# Patient Record
Sex: MALE | Race: OTHER | HISPANIC OR LATINO | URBAN - METROPOLITAN AREA
[De-identification: names, ages, dates, MRNs, and addresses within clinical notes are randomized per-mention and may not be internally consistent; named-entity substitution may affect disease eponyms.]

---

## 2018-11-07 ENCOUNTER — EMERGENCY (EMERGENCY)
Facility: HOSPITAL | Age: 58
LOS: 1 days | Discharge: HOME | End: 2018-11-07
Admitting: EMERGENCY MEDICINE
Payer: MEDICAID

## 2018-11-07 VITALS
SYSTOLIC BLOOD PRESSURE: 156 MMHG | DIASTOLIC BLOOD PRESSURE: 92 MMHG | OXYGEN SATURATION: 98 % | TEMPERATURE: 97 F | RESPIRATION RATE: 18 BRPM | HEART RATE: 73 BPM

## 2018-11-07 VITALS
TEMPERATURE: 98 F | DIASTOLIC BLOOD PRESSURE: 89 MMHG | OXYGEN SATURATION: 96 % | RESPIRATION RATE: 20 BRPM | HEART RATE: 71 BPM | SYSTOLIC BLOOD PRESSURE: 165 MMHG

## 2018-11-07 DIAGNOSIS — M54.9 DORSALGIA, UNSPECIFIED: ICD-10-CM

## 2018-11-07 DIAGNOSIS — F11.120 OPIOID ABUSE WITH INTOXICATION, UNCOMPLICATED: ICD-10-CM

## 2018-11-07 PROCEDURE — 99284 EMERGENCY DEPT VISIT MOD MDM: CPT | Mod: 25

## 2018-11-07 PROCEDURE — 70450 CT HEAD/BRAIN W/O DYE: CPT | Mod: 26

## 2018-11-07 PROCEDURE — 72131 CT LUMBAR SPINE W/O DYE: CPT | Mod: 26

## 2018-11-07 RX ORDER — KETOROLAC TROMETHAMINE 30 MG/ML
30 SYRINGE (ML) INJECTION ONCE
Qty: 0 | Refills: 0 | Status: DISCONTINUED | OUTPATIENT
Start: 2018-11-07 | End: 2018-11-07

## 2018-11-07 RX ORDER — TRAMADOL HYDROCHLORIDE 50 MG/1
50 TABLET ORAL ONCE
Qty: 0 | Refills: 0 | Status: DISCONTINUED | OUTPATIENT
Start: 2018-11-07 | End: 2018-11-07

## 2018-11-07 RX ORDER — DIAZEPAM 5 MG
5 TABLET ORAL ONCE
Qty: 0 | Refills: 0 | Status: DISCONTINUED | OUTPATIENT
Start: 2018-11-07 | End: 2018-11-07

## 2018-11-07 RX ADMIN — Medication 30 MILLIGRAM(S): at 00:49

## 2018-11-07 NOTE — ED ADULT NURSE NOTE - NSIMPLEMENTINTERV_GEN_ALL_ED
Implemented All Fall Risk Interventions:  [place] to call system. Call bell, personal items and telephone within reach. Instruct patient to call for assistance. Room bathroom lighting operational. Non-slip footwear when patient is off stretcher. Physically safe environment: no spills, clutter or unnecessary equipment. Stretcher in lowest position, wheels locked, appropriate side rails in place. Provide visual cue, wrist band, yellow gown, etc. Monitor gait and stability. Monitor for mental status changes and reorient to person, place, and time. Review medications for side effects contributing to fall risk. Reinforce activity limits and safety measures with patient and family.

## 2018-11-07 NOTE — ED ADULT TRIAGE NOTE - CHIEF COMPLAINT QUOTE
Patient arrives via EMS, was outside of a shelter, complaining of chronic back pain, however has been carrying two heavy bags around for the past two days, and states it's exaggerated his pain; patient was unable to ambulate in field due to pain

## 2018-11-07 NOTE — ED ADULT NURSE NOTE - CHPI ED NUR SYMPTOMS NEG
no decreased eating/drinking/no nausea/no vomiting/no weakness/no fever/no dizziness/no tingling/no chills

## 2018-11-07 NOTE — ED PROVIDER NOTE - NS ED ROS FT
· CONSTITUTIONAL: no fever and no chills.  · CARDIOVASCULAR: normal rate and rhythm, no chest pain and no edema.  · RESPIRATORY: no chest pain, no cough, and no shortness of breath.  · GASTROINTESTINAL: no abdominal pain, no bloating, no constipation, no diarrhea, no nausea and no vomiting.  · MUSCULOSKELETAL: + back pain, no musculoskeletal pain, no neck pain, and no weakness.  · SKIN: no abrasions, no jaundice, no lesions, no pruritis, and no rashes.  · NEURO: no loss of consciousness, no gait abnormality, no headache, no sensory deficits, and no weakness.  · PSYCHIATRIC: no known mental health issues.

## 2018-11-07 NOTE — ED PROVIDER NOTE - PHYSICAL EXAMINATION
VITAL SIGNS: I have reviewed nursing notes and confirm.  CONSTITUTIONAL: Well-developed; well-nourished; in no acute distress.  SKIN: Skin is warm and dry, no acute rash.  HEAD: Normocephalic; atraumatic.  EYES: PERRL, EOM intact; conjunctiva and sclera clear.  ENT: No nasal discharge; airway clear.  NECK: Supple; non tender.  CARD: S1, S2 normal; no murmurs, gallops, or rubs. Regular rate and rhythm.  RESP: No wheezes, rales or rhonchi.  ABD: Normal bowel sounds; soft; non-distended; non-tender;  no palpable or pulsating mass; no hepatosplenomegaly.  BACK: No cervical, thoracic ot lumbar spine tenderness to percussion or palpation. Flexion/extension of spine without pain. LE strength 5/5, sensation intact, no SI joint tenderness, SLR negative bilaterally  EXT: Normal ROM. No clubbing, cyanosis or edema.  NEURO: Alert, oriented. Grossly unremarkable.  PSYCH: Cooperative, appropriate.

## 2018-11-07 NOTE — ED PROVIDER NOTE - OBJECTIVE STATEMENT
58 year old male h/o chronic back pain, arthritis brought to ED with EMS for back pain. reports was outside of a shelter.   reports he has been carrying two heavy bags around for the past two days, and states it's exaggerated his pain. admits to using heroin a few hours ago, regularly uses heroin.  Denies fever, chills, dysuria, hematuria, flank pain, change in urinary/bowel function, numbness, tingling, focal weakness, abdominal pain, N/V/D/C, melena, hematochezia, CP, SOB, palpitations, diaphoresis, dizziness, HA, cough, rash, trauma, and fall.

## 2018-11-07 NOTE — ED PROVIDER NOTE - PROGRESS NOTE DETAILS
CT reviewed no acute findings. The patient is now awake and alert, clinically sober.  Able to walk a straight line.  Repeat exam and neuro/cranial nerve exams normal.  No evidence of head/neck trauma.  Patient denies any pain or other complaints.  Denies cp/sob/ha/abd pain.  Abd soft, lungs clear, heart exam normal.  [place] po challenge.  Patient says only used alcohol no other substances.  Denies any assault.  Feels much better and pt feels safe for discharge.  No evidence of intoxication at this time or alcohol withdrawal.  No other complaints on discharge.

## 2018-11-07 NOTE — ED ADULT NURSE NOTE - OBJECTIVE STATEMENT
Patient presents to ED and states "I have pain over my whole body". Pt denies trauma/injury. Pt states "I take heroine for my pain." Pt reports last heroine use one hour PTA. Pt denies any CP or SOB. Pt is lethargic, falling asleep during assessment, appears unkempt and has several large bags of belongings. No obvious injury or deformity noted.

## 2018-11-07 NOTE — ED PROVIDER NOTE - MEDICAL DECISION MAKING DETAILS
reported back pain from carrying heavy bags. admits to using heroin.  will get imaging. no pain medication at this time given sedation from drug use

## 2023-04-06 NOTE — ED PROVIDER NOTE - SECONDARY DIAGNOSIS.
"[facility] DAILY TREATMENT NOTE   04/06/23 0900   Pain Assessment   Pain Assessment Tool 0-10   Pain Score No Pain   Restrictions/Precautions   Precautions Cognitive; Fall Risk;Seizure   Speech/Language/Cognition Assessmetn   Treatment Assessment background music on for session  alternate verbalizing #/shape/color with 1 error completed task 2:44  rush hour level 2 set up dasia and solved dasia in 2:34  level 5 set up dasia and solved dasia in 5:00 with min cue  no background distraction for mental manipulation  4 words bwd order 0% dasia increased to 20% mod cue   3 words bwd 3/4 trials dasia  4words fwd @ 100% dasia  4 letter unscramble 100% dasia increased to 5 letter unscramble @ 90% dasia  deductive reasoning puzzle \"garden plots\" 4x3 with 8 clues completed with min cue    Provided with cognitive apps to download for HEP     Eating   Type of Assistance Needed Independent   Physical Assistance Level No physical assistance   Eating CARE Score 6   SLP Therapy Minutes   SLP Time In 0900   SLP Time Out 1000   SLP Total Time (minutes) 60   SLP Mode of treatment - Individual (minutes) 60   SLP Mode of treatment - Concurrent (minutes) 0   SLP Mode of treatment - Group (minutes) 0   SLP Mode of treatment - Co-treat (minutes) 0   SLP Mode of Treatment - Total time(minutes) 60 minutes   SLP Cumulative Minutes 180       " Drug intoxication, uncomplicated